# Patient Record
Sex: MALE | Race: OTHER | URBAN - METROPOLITAN AREA
[De-identification: names, ages, dates, MRNs, and addresses within clinical notes are randomized per-mention and may not be internally consistent; named-entity substitution may affect disease eponyms.]

---

## 2018-08-01 ENCOUNTER — OUTPATIENT (OUTPATIENT)
Dept: OUTPATIENT SERVICES | Facility: HOSPITAL | Age: 48
LOS: 1 days | Discharge: HOME | End: 2018-08-01

## 2018-08-01 DIAGNOSIS — F11.20 OPIOID DEPENDENCE, UNCOMPLICATED: ICD-10-CM

## 2018-08-30 ENCOUNTER — OUTPATIENT (OUTPATIENT)
Dept: OUTPATIENT SERVICES | Facility: HOSPITAL | Age: 48
LOS: 1 days | Discharge: HOME | End: 2018-08-30

## 2018-08-30 DIAGNOSIS — F11.20 OPIOID DEPENDENCE, UNCOMPLICATED: ICD-10-CM

## 2018-10-17 ENCOUNTER — EMERGENCY (EMERGENCY)
Facility: HOSPITAL | Age: 48
LOS: 0 days | Discharge: HOME | End: 2018-10-18
Admitting: PHYSICIAN ASSISTANT

## 2018-10-17 VITALS
OXYGEN SATURATION: 98 % | TEMPERATURE: 97 F | RESPIRATION RATE: 18 BRPM | SYSTOLIC BLOOD PRESSURE: 150 MMHG | HEART RATE: 72 BPM | DIASTOLIC BLOOD PRESSURE: 83 MMHG

## 2018-10-17 VITALS
OXYGEN SATURATION: 98 % | HEART RATE: 78 BPM | TEMPERATURE: 98 F | DIASTOLIC BLOOD PRESSURE: 7 MMHG | SYSTOLIC BLOOD PRESSURE: 152 MMHG | RESPIRATION RATE: 20 BRPM
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
## 2018-10-17 DIAGNOSIS — T15.01XA FOREIGN BODY IN CORNEA, RIGHT EYE, INITIAL ENCOUNTER: ICD-10-CM
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
## 2018-10-17 DIAGNOSIS — Z23 ENCOUNTER FOR IMMUNIZATION: ICD-10-CM

## 2018-10-17 DIAGNOSIS — T15.02XA FOREIGN BODY IN CORNEA, LEFT EYE, INITIAL ENCOUNTER: ICD-10-CM
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
## 2018-10-18 RX ORDER — TETANUS TOXOID, REDUCED DIPHTHERIA TOXOID AND ACELLULAR PERTUSSIS VACCINE, ADSORBED 5; 2.5; 8; 8; 2.5 [IU]/.5ML; [IU]/.5ML; UG/.5ML; UG/.5ML; UG/.5ML
0.5 SUSPENSION INTRAMUSCULAR ONCE
Qty: 0 | Refills: 0 | Status: COMPLETED | OUTPATIENT
Start: 2018-10-18 | End: 2018-10-18

## 2018-10-18 RX ORDER — POLYMYXIN B SULF/TRIMETHOPRIM 10000-1/ML
1 DROPS OPHTHALMIC (EYE) ONCE
Qty: 0 | Refills: 0 | Status: COMPLETED | OUTPATIENT
Start: 2018-10-18 | End: 2018-10-18

## 2018-10-18 RX ADMIN — Medication 1 DROP(S): at 00:30

## 2018-10-18 RX ADMIN — TETANUS TOXOID, REDUCED DIPHTHERIA TOXOID AND ACELLULAR PERTUSSIS VACCINE, ADSORBED 0.5 MILLILITER(S): 5; 2.5; 8; 8; 2.5 SUSPENSION INTRAMUSCULAR at 00:30

## 2018-10-18 NOTE — ED PROVIDER NOTE - PHYSICAL EXAMINATION
CONST: Well appearing in NAD  EYES: + small corneal abrasions noted to bilateral eyes, no ulcers or reggie sign on flourstein stain, PERRL, EOMI, Sclera and conjunctiva clear. Vision 20/20 bilaterally   ENT: No nasal discharge. TM's clear B/L without drainage. Oropharynx normal appearing, no erythema or exudates. Uvula midline.  CARD: Normal S1 S2; Normal rate and rhythm  RESP: Equal BS B/L, No wheezes, rhonchi or rales. No distress  MS: Normal ROM in all extremities. No midline spinal tenderness.  SKIN: Warm, dry, no acute rashes. Good turgor

## 2018-10-18 NOTE — ED PROVIDER NOTE - NS ED ROS FT
Review of Systems:  	•	CONSTITUTIONAL - no fever, no diaphoresis, no chills  	•	SKIN - no rash  	•	EYES - + bilateral irritation   	•	ENT - no change in hearing, no sore throat, no ear pain or tinnitus  	•	RESPIRATORY - no shortness of breath, no cough  	•	CARDIAC - no chest pain, no palpitations  	•	GI - no abd pain, no nausea, no vomiting, no diarrhea, no constipation  	•	MUSCULOSKELETAL - no joint paint, no swelling, no redness

## 2018-10-18 NOTE — ED PROVIDER NOTE - OBJECTIVE STATEMENT
48 year old male with no pmhx presents with foreign body to bilateral eyes. Pt admits was using  at work earlier today at4pm, when debris went into eyes. Pt admits was feeling fine however now developing mild irritation. Pt denies visual changes, discharge, or pain.

## 2020-04-20 ENCOUNTER — OUTPATIENT (OUTPATIENT)
Dept: OUTPATIENT SERVICES | Facility: HOSPITAL | Age: 50
LOS: 1 days | Discharge: HOME | End: 2020-04-20
Payer: MEDICAID

## 2020-04-20 DIAGNOSIS — F11.20 OPIOID DEPENDENCE, UNCOMPLICATED: ICD-10-CM

## 2020-04-20 PROCEDURE — 99215 OFFICE O/P EST HI 40 MIN: CPT

## 2020-05-15 ENCOUNTER — OUTPATIENT (OUTPATIENT)
Dept: OUTPATIENT SERVICES | Facility: HOSPITAL | Age: 50
LOS: 1 days | Discharge: HOME | End: 2020-05-15
Payer: MEDICAID

## 2020-05-15 DIAGNOSIS — F11.20 OPIOID DEPENDENCE, UNCOMPLICATED: ICD-10-CM

## 2020-05-15 PROCEDURE — 99212 OFFICE O/P EST SF 10 MIN: CPT

## 2020-06-26 ENCOUNTER — OUTPATIENT (OUTPATIENT)
Dept: OUTPATIENT SERVICES | Facility: HOSPITAL | Age: 50
LOS: 1 days | Discharge: HOME | End: 2020-06-26

## 2020-06-26 DIAGNOSIS — F11.20 OPIOID DEPENDENCE, UNCOMPLICATED: ICD-10-CM

## 2020-08-14 ENCOUNTER — OUTPATIENT (OUTPATIENT)
Dept: OUTPATIENT SERVICES | Facility: HOSPITAL | Age: 50
LOS: 1 days | Discharge: HOME | End: 2020-08-14

## 2020-08-14 DIAGNOSIS — F11.20 OPIOID DEPENDENCE, UNCOMPLICATED: ICD-10-CM

## 2022-01-02 ENCOUNTER — EMERGENCY (EMERGENCY)
Facility: HOSPITAL | Age: 52
LOS: 0 days | Discharge: HOME | End: 2022-01-02
Attending: STUDENT IN AN ORGANIZED HEALTH CARE EDUCATION/TRAINING PROGRAM | Admitting: STUDENT IN AN ORGANIZED HEALTH CARE EDUCATION/TRAINING PROGRAM
Payer: MEDICAID

## 2022-01-02 VITALS
DIASTOLIC BLOOD PRESSURE: 77 MMHG | RESPIRATION RATE: 18 BRPM | OXYGEN SATURATION: 100 % | SYSTOLIC BLOOD PRESSURE: 162 MMHG | TEMPERATURE: 98 F | WEIGHT: 225.09 LBS | HEART RATE: 76 BPM

## 2022-01-02 VITALS
OXYGEN SATURATION: 100 % | SYSTOLIC BLOOD PRESSURE: 138 MMHG | DIASTOLIC BLOOD PRESSURE: 70 MMHG | RESPIRATION RATE: 18 BRPM | HEART RATE: 70 BPM | TEMPERATURE: 98 F

## 2022-01-02 DIAGNOSIS — K08.89 OTHER SPECIFIED DISORDERS OF TEETH AND SUPPORTING STRUCTURES: ICD-10-CM

## 2022-01-02 DIAGNOSIS — K02.9 DENTAL CARIES, UNSPECIFIED: ICD-10-CM

## 2022-01-02 DIAGNOSIS — K03.2 EROSION OF TEETH: ICD-10-CM

## 2022-01-02 PROCEDURE — 99283 EMERGENCY DEPT VISIT LOW MDM: CPT

## 2022-01-02 RX ORDER — IBUPROFEN 200 MG
600 TABLET ORAL ONCE
Refills: 0 | Status: COMPLETED | OUTPATIENT
Start: 2022-01-02 | End: 2022-01-02

## 2022-01-02 RX ORDER — IBUPROFEN 200 MG
1 TABLET ORAL
Qty: 12 | Refills: 0
Start: 2022-01-02 | End: 2022-01-04

## 2022-01-02 RX ORDER — AMOXICILLIN 250 MG/5ML
1 SUSPENSION, RECONSTITUTED, ORAL (ML) ORAL
Qty: 10 | Refills: 0
Start: 2022-01-02 | End: 2022-01-06

## 2022-01-02 RX ADMIN — Medication 600 MILLIGRAM(S): at 19:26

## 2022-01-02 NOTE — ED PROVIDER NOTE - OBJECTIVE STATEMENT
51M no pmhx presents with toothache x several months that worsened today, notes it is right upper, denies bleeding/purulent drainage/shortness of breath/difficulty swallowing, has not seen dentist in years.

## 2022-01-02 NOTE — ED PROVIDER NOTE - PHYSICAL EXAMINATION
Vital Signs: I have reviewed the initial vital signs.  Constitutional: well-nourished, appears stated age, no acute distress.  HEENT: Airway patent, moist MM, no erythema/swelling/deformity of oral structures, dental caries and erosion seen on tooth 3. EOMI, PERRLA.  CV: regular rate, regular rhythm, well-perfused extremities, 2+ b/l DP and radial pulses equal.  Lungs: BCTA, no increased WOB.  ABD: NTND, no guarding or rebound, no pulsatile mass, no hernias, no flank pain.   MSK: Neck supple, nontender, nl ROM, no stepoff. Chest nontender. Back nontender in TLS spine or to b/l bony structures. Ext nontender, nl rom, no deformity.   INTEG: Skin warm, dry, no rash.  NEURO: A&Ox3, moving all extremities, normal speech  PSYCH: Calm, cooperative, normal affect and interaction.

## 2022-01-02 NOTE — ED ADULT NURSE NOTE - NSSEPSISNEWALTERMENTAL_ED_A_ED
No

## 2022-01-02 NOTE — ED PROVIDER NOTE - PATIENT PORTAL LINK FT
You can access the FollowMyHealth Patient Portal offered by [facility] by registering at the following website: [email or web address]. By joining [employer]’s FollowMyHealth portal, you will also be able to view your health information using other applications (apps) compatible with our system.

## 2022-01-02 NOTE — ED PROVIDER NOTE - CLINICAL SUMMARY MEDICAL DECISION MAKING FREE TEXT BOX
51M p/w toothache for several months to R upper tooth; no fever, voice change, inability to tolerate po. pt well appearing, mmm, + ttp with dental carries to tooth #3, uvula midline, no PTA, no tongue elevation or ttp floor of mouth, no lymphadenopathy. pt given motrin, and f/u with dental for definitive treatment.

## 2022-01-02 NOTE — ED PROVIDER NOTE - NSFOLLOWUPCLINICS_GEN_ALL_ED_FT
[facility]  Dental  [address] [place] [id number]  Phone: [phone]  Fax:   Follow Up Time: 1-3 Days

## 2023-12-05 ENCOUNTER — EMERGENCY (EMERGENCY)
Facility: HOSPITAL | Age: 53
LOS: 0 days | Discharge: HOME | End: 2023-12-06
Attending: EMERGENCY MEDICINE
Payer: MEDICAID

## 2023-12-05 VITALS
SYSTOLIC BLOOD PRESSURE: 144 MMHG | OXYGEN SATURATION: 99 % | RESPIRATION RATE: 19 BRPM | HEART RATE: 89 BPM | DIASTOLIC BLOOD PRESSURE: 91 MMHG

## 2023-12-05 DIAGNOSIS — R05.8 OTHER SPECIFIED COUGH: ICD-10-CM

## 2023-12-05 DIAGNOSIS — R07.89 OTHER CHEST PAIN: ICD-10-CM

## 2023-12-05 DIAGNOSIS — R06.02 SHORTNESS OF BREATH: ICD-10-CM

## 2023-12-05 DIAGNOSIS — F17.200 NICOTINE DEPENDENCE, UNSPECIFIED, UNCOMPLICATED: ICD-10-CM

## 2023-12-05 LAB
ALBUMIN SERPL ELPH-MCNC: 4.3 G/DL — SIGNIFICANT CHANGE UP (ref 3.5–5.2)
ALBUMIN SERPL ELPH-MCNC: 4.3 G/DL — SIGNIFICANT CHANGE UP (ref 3.5–5.2)
ALP SERPL-CCNC: 76 U/L — SIGNIFICANT CHANGE UP (ref 30–115)
ALP SERPL-CCNC: 76 U/L — SIGNIFICANT CHANGE UP (ref 30–115)
ALT FLD-CCNC: 24 U/L — SIGNIFICANT CHANGE UP (ref 0–41)
ALT FLD-CCNC: 24 U/L — SIGNIFICANT CHANGE UP (ref 0–41)
ANION GAP SERPL CALC-SCNC: 12 MMOL/L — SIGNIFICANT CHANGE UP (ref 7–14)
ANION GAP SERPL CALC-SCNC: 12 MMOL/L — SIGNIFICANT CHANGE UP (ref 7–14)
AST SERPL-CCNC: 23 U/L — SIGNIFICANT CHANGE UP (ref 0–41)
AST SERPL-CCNC: 23 U/L — SIGNIFICANT CHANGE UP (ref 0–41)
BASOPHILS # BLD AUTO: 0.05 K/UL — SIGNIFICANT CHANGE UP (ref 0–0.2)
BASOPHILS # BLD AUTO: 0.05 K/UL — SIGNIFICANT CHANGE UP (ref 0–0.2)
BASOPHILS NFR BLD AUTO: 0.7 % — SIGNIFICANT CHANGE UP (ref 0–1)
BASOPHILS NFR BLD AUTO: 0.7 % — SIGNIFICANT CHANGE UP (ref 0–1)
BILIRUB SERPL-MCNC: <0.2 MG/DL — SIGNIFICANT CHANGE UP (ref 0.2–1.2)
BILIRUB SERPL-MCNC: <0.2 MG/DL — SIGNIFICANT CHANGE UP (ref 0.2–1.2)
BUN SERPL-MCNC: 22 MG/DL — HIGH (ref 10–20)
BUN SERPL-MCNC: 22 MG/DL — HIGH (ref 10–20)
CALCIUM SERPL-MCNC: 9.2 MG/DL — SIGNIFICANT CHANGE UP (ref 8.4–10.5)
CALCIUM SERPL-MCNC: 9.2 MG/DL — SIGNIFICANT CHANGE UP (ref 8.4–10.5)
CHLORIDE SERPL-SCNC: 106 MMOL/L — SIGNIFICANT CHANGE UP (ref 98–110)
CHLORIDE SERPL-SCNC: 106 MMOL/L — SIGNIFICANT CHANGE UP (ref 98–110)
CO2 SERPL-SCNC: 24 MMOL/L — SIGNIFICANT CHANGE UP (ref 17–32)
CO2 SERPL-SCNC: 24 MMOL/L — SIGNIFICANT CHANGE UP (ref 17–32)
CREAT SERPL-MCNC: 1.2 MG/DL — SIGNIFICANT CHANGE UP (ref 0.7–1.5)
CREAT SERPL-MCNC: 1.2 MG/DL — SIGNIFICANT CHANGE UP (ref 0.7–1.5)
EGFR: 71 ML/MIN/1.73M2 — SIGNIFICANT CHANGE UP
EGFR: 71 ML/MIN/1.73M2 — SIGNIFICANT CHANGE UP
EOSINOPHIL # BLD AUTO: 0.31 K/UL — SIGNIFICANT CHANGE UP (ref 0–0.7)
EOSINOPHIL # BLD AUTO: 0.31 K/UL — SIGNIFICANT CHANGE UP (ref 0–0.7)
EOSINOPHIL NFR BLD AUTO: 4.2 % — SIGNIFICANT CHANGE UP (ref 0–8)
EOSINOPHIL NFR BLD AUTO: 4.2 % — SIGNIFICANT CHANGE UP (ref 0–8)
GLUCOSE SERPL-MCNC: 102 MG/DL — HIGH (ref 70–99)
GLUCOSE SERPL-MCNC: 102 MG/DL — HIGH (ref 70–99)
HCT VFR BLD CALC: 43.8 % — SIGNIFICANT CHANGE UP (ref 42–52)
HCT VFR BLD CALC: 43.8 % — SIGNIFICANT CHANGE UP (ref 42–52)
HGB BLD-MCNC: 14.1 G/DL — SIGNIFICANT CHANGE UP (ref 14–18)
HGB BLD-MCNC: 14.1 G/DL — SIGNIFICANT CHANGE UP (ref 14–18)
IMM GRANULOCYTES NFR BLD AUTO: 0.4 % — HIGH (ref 0.1–0.3)
IMM GRANULOCYTES NFR BLD AUTO: 0.4 % — HIGH (ref 0.1–0.3)
LYMPHOCYTES # BLD AUTO: 2.27 K/UL — SIGNIFICANT CHANGE UP (ref 1.2–3.4)
LYMPHOCYTES # BLD AUTO: 2.27 K/UL — SIGNIFICANT CHANGE UP (ref 1.2–3.4)
LYMPHOCYTES # BLD AUTO: 30.8 % — SIGNIFICANT CHANGE UP (ref 20.5–51.1)
LYMPHOCYTES # BLD AUTO: 30.8 % — SIGNIFICANT CHANGE UP (ref 20.5–51.1)
MAGNESIUM SERPL-MCNC: 2.1 MG/DL — SIGNIFICANT CHANGE UP (ref 1.8–2.4)
MAGNESIUM SERPL-MCNC: 2.1 MG/DL — SIGNIFICANT CHANGE UP (ref 1.8–2.4)
MCHC RBC-ENTMCNC: 29.7 PG — SIGNIFICANT CHANGE UP (ref 27–31)
MCHC RBC-ENTMCNC: 29.7 PG — SIGNIFICANT CHANGE UP (ref 27–31)
MCHC RBC-ENTMCNC: 32.2 G/DL — SIGNIFICANT CHANGE UP (ref 32–37)
MCHC RBC-ENTMCNC: 32.2 G/DL — SIGNIFICANT CHANGE UP (ref 32–37)
MCV RBC AUTO: 92.4 FL — SIGNIFICANT CHANGE UP (ref 80–94)
MCV RBC AUTO: 92.4 FL — SIGNIFICANT CHANGE UP (ref 80–94)
MONOCYTES # BLD AUTO: 0.72 K/UL — HIGH (ref 0.1–0.6)
MONOCYTES # BLD AUTO: 0.72 K/UL — HIGH (ref 0.1–0.6)
MONOCYTES NFR BLD AUTO: 9.8 % — HIGH (ref 1.7–9.3)
MONOCYTES NFR BLD AUTO: 9.8 % — HIGH (ref 1.7–9.3)
NEUTROPHILS # BLD AUTO: 4 K/UL — SIGNIFICANT CHANGE UP (ref 1.4–6.5)
NEUTROPHILS # BLD AUTO: 4 K/UL — SIGNIFICANT CHANGE UP (ref 1.4–6.5)
NEUTROPHILS NFR BLD AUTO: 54.1 % — SIGNIFICANT CHANGE UP (ref 42.2–75.2)
NEUTROPHILS NFR BLD AUTO: 54.1 % — SIGNIFICANT CHANGE UP (ref 42.2–75.2)
NRBC # BLD: 0 /100 WBCS — SIGNIFICANT CHANGE UP (ref 0–0)
NRBC # BLD: 0 /100 WBCS — SIGNIFICANT CHANGE UP (ref 0–0)
PLATELET # BLD AUTO: 248 K/UL — SIGNIFICANT CHANGE UP (ref 130–400)
PLATELET # BLD AUTO: 248 K/UL — SIGNIFICANT CHANGE UP (ref 130–400)
PMV BLD: 11.2 FL — HIGH (ref 7.4–10.4)
PMV BLD: 11.2 FL — HIGH (ref 7.4–10.4)
POTASSIUM SERPL-MCNC: 4.4 MMOL/L — SIGNIFICANT CHANGE UP (ref 3.5–5)
POTASSIUM SERPL-MCNC: 4.4 MMOL/L — SIGNIFICANT CHANGE UP (ref 3.5–5)
POTASSIUM SERPL-SCNC: 4.4 MMOL/L — SIGNIFICANT CHANGE UP (ref 3.5–5)
POTASSIUM SERPL-SCNC: 4.4 MMOL/L — SIGNIFICANT CHANGE UP (ref 3.5–5)
PROT SERPL-MCNC: 7.5 G/DL — SIGNIFICANT CHANGE UP (ref 6–8)
PROT SERPL-MCNC: 7.5 G/DL — SIGNIFICANT CHANGE UP (ref 6–8)
RBC # BLD: 4.74 M/UL — SIGNIFICANT CHANGE UP (ref 4.7–6.1)
RBC # BLD: 4.74 M/UL — SIGNIFICANT CHANGE UP (ref 4.7–6.1)
RBC # FLD: 14.2 % — SIGNIFICANT CHANGE UP (ref 11.5–14.5)
RBC # FLD: 14.2 % — SIGNIFICANT CHANGE UP (ref 11.5–14.5)
SODIUM SERPL-SCNC: 142 MMOL/L — SIGNIFICANT CHANGE UP (ref 135–146)
SODIUM SERPL-SCNC: 142 MMOL/L — SIGNIFICANT CHANGE UP (ref 135–146)
TROPONIN T SERPL-MCNC: <0.01 NG/ML — SIGNIFICANT CHANGE UP
TROPONIN T SERPL-MCNC: <0.01 NG/ML — SIGNIFICANT CHANGE UP
WBC # BLD: 7.38 K/UL — SIGNIFICANT CHANGE UP (ref 4.8–10.8)
WBC # BLD: 7.38 K/UL — SIGNIFICANT CHANGE UP (ref 4.8–10.8)
WBC # FLD AUTO: 7.38 K/UL — SIGNIFICANT CHANGE UP (ref 4.8–10.8)
WBC # FLD AUTO: 7.38 K/UL — SIGNIFICANT CHANGE UP (ref 4.8–10.8)

## 2023-12-05 PROCEDURE — 71045 X-RAY EXAM CHEST 1 VIEW: CPT

## 2023-12-05 PROCEDURE — 85025 COMPLETE CBC W/AUTO DIFF WBC: CPT

## 2023-12-05 PROCEDURE — 96374 THER/PROPH/DIAG INJ IV PUSH: CPT

## 2023-12-05 PROCEDURE — 71045 X-RAY EXAM CHEST 1 VIEW: CPT | Mod: 26

## 2023-12-05 PROCEDURE — 84484 ASSAY OF TROPONIN QUANT: CPT

## 2023-12-05 PROCEDURE — 99285 EMERGENCY DEPT VISIT HI MDM: CPT | Mod: 25

## 2023-12-05 PROCEDURE — 80053 COMPREHEN METABOLIC PANEL: CPT

## 2023-12-05 PROCEDURE — 93010 ELECTROCARDIOGRAM REPORT: CPT

## 2023-12-05 PROCEDURE — 99285 EMERGENCY DEPT VISIT HI MDM: CPT

## 2023-12-05 PROCEDURE — 83735 ASSAY OF MAGNESIUM: CPT

## 2023-12-05 PROCEDURE — 36415 COLL VENOUS BLD VENIPUNCTURE: CPT

## 2023-12-05 PROCEDURE — 93005 ELECTROCARDIOGRAM TRACING: CPT

## 2023-12-05 PROCEDURE — 94640 AIRWAY INHALATION TREATMENT: CPT

## 2023-12-05 RX ORDER — ALBUTEROL 90 UG/1
2 AEROSOL, METERED ORAL EVERY 6 HOURS
Refills: 0 | Status: DISCONTINUED | OUTPATIENT
Start: 2023-12-05 | End: 2023-12-06

## 2023-12-05 RX ORDER — IPRATROPIUM/ALBUTEROL SULFATE 18-103MCG
9 AEROSOL WITH ADAPTER (GRAM) INHALATION ONCE
Refills: 0 | Status: COMPLETED | OUTPATIENT
Start: 2023-12-05 | End: 2023-12-05

## 2023-12-05 RX ORDER — DEXAMETHASONE 0.5 MG/5ML
10 ELIXIR ORAL ONCE
Refills: 0 | Status: COMPLETED | OUTPATIENT
Start: 2023-12-05 | End: 2023-12-05

## 2023-12-05 RX ORDER — OMEPRAZOLE 10 MG/1
1 CAPSULE, DELAYED RELEASE ORAL
Qty: 14 | Refills: 0
Start: 2023-12-05 | End: 2023-12-18

## 2023-12-05 RX ORDER — FAMOTIDINE 10 MG/ML
1 INJECTION INTRAVENOUS
Qty: 42 | Refills: 0
Start: 2023-12-05 | End: 2023-12-18

## 2023-12-05 RX ORDER — FAMOTIDINE 10 MG/ML
20 INJECTION INTRAVENOUS ONCE
Refills: 0 | Status: COMPLETED | OUTPATIENT
Start: 2023-12-05 | End: 2023-12-05

## 2023-12-05 RX ADMIN — FAMOTIDINE 20 MILLIGRAM(S): 10 INJECTION INTRAVENOUS at 22:47

## 2023-12-05 RX ADMIN — ALBUTEROL 2 PUFF(S): 90 AEROSOL, METERED ORAL at 23:58

## 2023-12-05 RX ADMIN — Medication 9 MILLILITER(S): at 21:16

## 2023-12-05 RX ADMIN — Medication 10 MILLIGRAM(S): at 21:15

## 2023-12-05 NOTE — ED ADULT TRIAGE NOTE - CHIEF COMPLAINT QUOTE
pt shortness of breath x 1 month was put on antibiotics 4-5 days ago but states he feels like its getting owrse also co chest pain x 2 weeks

## 2023-12-05 NOTE — ED PROVIDER NOTE - PROGRESS NOTE DETAILS
JS: Patient feeling better after nebs and steroid. Labs WNL, CXR and EKG wnl. Will dicahrge w/ PPI, albuterol inhaler and follow up with ENT, cards and pulm. Patient's partner will scheudle follow up apt with PCP Dr. Haney. Strict return precautions discussed.

## 2023-12-05 NOTE — ED ADULT TRIAGE NOTE - CCCP TRG CHIEF CMPLNT
Dear Karolyn,    Red AdvertisingMarshfield Medical Center - Ladysmith Rusk County.  It was an honor and a privilege to participate in your care.       Healthy regards,    Dominique Escalona, PRAKASH  [facility] shortness of breath

## 2023-12-05 NOTE — ED PROVIDER NOTE - NS ED ATTENDING STATEMENT MOD
This was a shared visit with the SPENCER. I reviewed and verified the documentation and independently performed the documented:

## 2023-12-05 NOTE — ED PROVIDER NOTE - CLINICAL SUMMARY MEDICAL DECISION MAKING FREE TEXT BOX
53-year-old man, smoker, but no other medical history complains of dry cough intermittent over the past month, occasionally associated with shortness of breath, mostly after a "coughing fit" and burning chest discomfort worse at night and with eating.  Went to an urgent care about a week ago and got antibiotics, but no improvement.  On exam patient is very well-appearing, dry, upper airway cough noted.  Mild pharyngeal erythema without exudate.  Lungs clear, CV S1-S2, abdomen soft, nontender.  Doubt cardiac etiology.  Cough sounds bronchospastic in nature, patient is a smoker, drinks a lot of coffee.  Gave nebs and steroids with some improvement.  EKG and chest x-ray unremarkable.  Symptoms likely hyperactive airway versus postnasal drip, possible GERD component.  Patient discharged with PPI, albuterol inhaler, ENT/pulmonology follow-up.  Strict return precautions given patient is comfortable with discharge.

## 2023-12-05 NOTE — ED PROVIDER NOTE - PATIENT PORTAL LINK FT
You can access the FollowMyHealth Patient Portal offered by [facility] by registering at the following website: [email or web address]. By joining [employer]’s FollowMyHealth portal, you will also be able to view your health information using other applications (apps) compatible with our system. You can access the FollowMyHealth Patient Portal offered by [facility] by registering at the following website: [email or web address]. By joining [employer]’s FollowMyHealth portal, you will also be able to view your health information using other applications (apps) compatible with our system.

## 2023-12-05 NOTE — ED PROVIDER NOTE - OBJECTIVE STATEMENT
The 53-year-old male daily smoker with no reported past medical history presents to the ED complaining of cough x 1 month. Patient w/ intermittent cough w/ associated productive sputum. Assocaited SOB and chest burning intermittently. Went to UC 5 days ago recieved abx w. minimal improvement. No feves, chills, N/v/d abd pain, fhx of cardiac dz.

## 2023-12-05 NOTE — ED PROVIDER NOTE - NSFOLLOWUPINSTRUCTIONS_ED_ALL_ED_FT
Shortness of breath    Shortness of breath means you have trouble breathing and could indicate a medical problem. Causes include lung diseases, heart disease, low amount of red blood cells (anemia), poor physical fitness, being overweight, smoking, etc. Your health care provider may not be able to find a cause for your shortness of breath after your exam. In this case, it is important to have a follow-up exam with your primary care physician as instructed. If medicines were prescribed, take them as directed for the full length of time directed. Refrain from tobacco products.    SEEK IMMEDIATE MEDICAL CARE IF YOU HAVE THE FOLLOWING SYMPTOMS: worsening shortness of breath, chest pain, back pain, abdominal pain, fever, coughing up blood, lightheadedness/dizziness.    Postnasal Drip    WHAT YOU NEED TO KNOW:    What is postnasal drip? Postnasal drip is a condition that causes a large amount of mucus to collect in your throat or nose. It may also be called upper airway cough syndrome because the mucus causes repeated coughing. You may have a sore throat, or throat tissues may swell. This may feel like a lump in your throat. You may also feel like you need to clear your throat often.    What causes postnasal drip?    A cold or the flu    Allergies, such as hay fever or a milk allergy    Cold air, or dry air in a heated area    Pregnancy or hormone changes    Medical conditions such as a deviated septum, gastroesophageal reflux (GERD), or problems with structures in your throat    Certain medicines, such as birth control pills and blood pressure medicines    An infection in your sinuses or nose  How is postnasal drip diagnosed and treated? Your healthcare provider will examine you and ask about your symptoms. Tell your provider if you have symptoms all the time or if they come and go. Include anything that triggers your symptoms, such as cold air or pollen. A sample of the mucus may be tested for bacteria that could be causing your symptoms.    Medicines may be given to thin the mucus. You may need to swallow the medicine or use a device to flush your sinuses with liquid squirted into your nose. Nasal sprays may also be needed to keep the tissues in your nose moist. Medicines can also relieve congestion. Allergy medicine may help if your symptoms are caused by seasonal allergies, such as hay fever. You may need medicine to help control GERD.    Antibiotics may be needed to treat a bacterial infection.  What can I do to manage postnasal drip?    Use a humidifier or vaporizer. Use a cool mist humidifier or a vaporizer to increase air moisture in your home. This may make it easier for you to breathe.    Drink more liquids as directed. Liquids help keep your air passages moist and help you cough up mucus. Ask how much liquid to drink each day and which liquids are best for you.    Avoid cold air and dry, heated air. Cold or dry air can trigger postnasal drip. Try to stay inside on cold days, or keep your mouth covered. Do not stay long in areas that have dry, heated air.    Do not smoke, and avoid secondhand smoke. Nicotine and other chemicals in cigarettes and cigars can irritate your throat and make coughing worse. Ask your healthcare provider for information if you currently smoke and need help to quit. E-cigarettes or smokeless tobacco still contain nicotine. Talk to your healthcare provider before you use these products.  When should I contact my healthcare provider?    You have trouble breathing because of the mucus.    You have new or worsening symptoms, even with treatment.    You have signs of an infection, such as yellow or green mucus, or a fever.    You have questions or concerns about your condition or care.  CARE AGREEMENT:    You have the right to help plan your care. Learn about your health condition and how it may be treated. Discuss treatment options with your healthcare providers to decide what care you want to receive. You always have the right to refuse treatment.

## 2023-12-05 NOTE — ED PROVIDER NOTE - NSPTACCESSSVCSAPPTDETAILS_ED_ALL_ED_FT
Post nasal drip - ENT  Please also schedule for primary care - does not have a PCP Post nasal drip - ENT  Please also schedule for pulmonologist and cardiologist

## 2023-12-06 PROBLEM — Z78.9 OTHER SPECIFIED HEALTH STATUS: Chronic | Status: ACTIVE | Noted: 2022-01-02

## 2023-12-06 NOTE — ED ADULT NURSE NOTE - NSFALLUNIVINTERV_ED_ALL_ED
Bed/Stretcher in lowest position, wheels locked, appropriate side rails in place/Call bell, personal items and telephone in reach/Instruct patient to call for assistance before getting out of bed/chair/stretcher/Non-slip footwear applied when patient is off stretcher/[place] to call system/Physically safe environment - no spills, clutter or unnecessary equipment/Purposeful proactive rounding/Room/bathroom lighting operational, light cord in reach Bed/Stretcher in lowest position, wheels locked, appropriate side rails in place/Call bell, personal items and telephone in reach/Instruct patient to call for assistance before getting out of bed/chair/stretcher/Non-slip footwear applied when patient is off stretcher/[place] to call system/Physically safe environment - no spills, clutter or unnecessary equipment/Purposeful proactive rounding/Room/bathroom lighting operational, light cord in reach

## 2024-01-31 ENCOUNTER — NON-APPOINTMENT (OUTPATIENT)
Age: 54
End: 2024-01-31

## 2024-02-02 ENCOUNTER — EMERGENCY (EMERGENCY)
Facility: HOSPITAL | Age: 54
LOS: 0 days | Discharge: HOME | End: 2024-02-02
Attending: EMERGENCY MEDICINE
Payer: MEDICAID

## 2024-02-02 VITALS
DIASTOLIC BLOOD PRESSURE: 69 MMHG | HEART RATE: 91 BPM | WEIGHT: 259.93 LBS | OXYGEN SATURATION: 95 % | RESPIRATION RATE: 18 BRPM | SYSTOLIC BLOOD PRESSURE: 141 MMHG | HEIGHT: 68 IN | TEMPERATURE: 98 F

## 2024-02-02 DIAGNOSIS — Z23 ENCOUNTER FOR IMMUNIZATION: ICD-10-CM

## 2024-02-02 DIAGNOSIS — T15.92XA FOREIGN BODY ON EXTERNAL EYE, PART UNSPECIFIED, LEFT EYE, INITIAL ENCOUNTER: ICD-10-CM
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
## 2024-02-02 PROCEDURE — 90471 IMMUNIZATION ADMIN: CPT

## 2024-02-02 PROCEDURE — 99284 EMERGENCY DEPT VISIT MOD MDM: CPT | Mod: 25

## 2024-02-02 PROCEDURE — 99283 EMERGENCY DEPT VISIT LOW MDM: CPT

## 2024-02-02 PROCEDURE — 90715 TDAP VACCINE 7 YRS/> IM: CPT

## 2024-02-02 PROCEDURE — 99284 EMERGENCY DEPT VISIT MOD MDM: CPT

## 2024-02-02 RX ORDER — TETANUS TOXOID, REDUCED DIPHTHERIA TOXOID AND ACELLULAR PERTUSSIS VACCINE, ADSORBED 5; 2.5; 8; 8; 2.5 [IU]/.5ML; [IU]/.5ML; UG/.5ML; UG/.5ML; UG/.5ML
0.5 SUSPENSION INTRAMUSCULAR ONCE
Refills: 0 | Status: COMPLETED | OUTPATIENT
Start: 2024-02-02 | End: 2024-02-02

## 2024-02-02 RX ORDER — OFLOXACIN 0.3 %
1 DROPS OPHTHALMIC (EYE)
Qty: 1 | Refills: 0
Start: 2024-02-02

## 2024-02-02 RX ORDER — FLUORESCEIN SODIUM 9 MG
1 STRIP OPHTHALMIC (EYE) ONCE
Refills: 0 | Status: COMPLETED | OUTPATIENT
Start: 2024-02-02 | End: 2024-02-02

## 2024-02-02 RX ADMIN — TETANUS TOXOID, REDUCED DIPHTHERIA TOXOID AND ACELLULAR PERTUSSIS VACCINE, ADSORBED 0.5 MILLILITER(S): 5; 2.5; 8; 8; 2.5 SUSPENSION INTRAMUSCULAR at 15:49

## 2024-02-02 RX ADMIN — Medication 1 APPLICATION(S): at 12:54

## 2024-02-02 RX ADMIN — Medication 1 DROP(S): at 12:54

## 2024-02-02 NOTE — ED PROVIDER NOTE - PHYSICAL EXAMINATION
CONSTITUTIONAL: Well-developed; well-nourished; in no acute distress.   SKIN: Warm, dry  HEAD: Normocephalic; atraumatic  EYES: PERRL, EOMI, normal sclera and conjunctiva. OD 20/30; OS 20/25. Left cornea pinpoint sized FB, no fluorescein uptake, no discharge.  ENT: No nasal discharge; airway clear.  EXT: Normal ROM.   NEURO: Alert, oriented, grossly unremarkable  PSYCH: Cooperative, appropriate.

## 2024-02-02 NOTE — ED PROVIDER NOTE - PROGRESS NOTE DETAILS
Authored by Dr. Sanchez: pt left to [facility] Authored by Dr. Sanchez: s/o to dr gamez - f/u ophtho eval and dispo

## 2024-02-02 NOTE — ED PROVIDER NOTE - ATTENDING CONTRIBUTION TO CARE
53-year-old with left eye foreign body.  Seen yesterday in urgent care.  Referred to ophthalmology.  Came to emergency department.    Vital stable nontoxic no photophobia EOMI PERRL conjunctiva is not injected,  positive for body OS, small punctum.  No hyphema.    Ophthalmology eval

## 2024-02-02 NOTE — ED PROVIDER NOTE - NSFOLLOWUPCLINICS_GEN_ALL_ED_FT
[facility]  Ophthalmolgy  [address], [address]  [place] [address]  Phone: [phone]  Fax:   Follow Up Time: 1-3 Days

## 2024-02-02 NOTE — ED ADULT TRIAGE NOTE - PRO INTERPRETER NEED 2
Upon first contact with patient this RN receives bedside shift report from [facility]. Pt resting quietly in room no needs expressed. Side rails up x2 with call light in reach. Will continue to monitor.          Roni Lopez RN  05/27/20 1912
English

## 2024-02-02 NOTE — CONSULT NOTE ADULT - ASSESSMENT
ASSESSMENT  1. Metallic Foreign Body OS   - removed in office without complication  - mild residual rust s/p algar brush. Monitor at follow up    RECOMMENDATIONS  - Start ocuflox ophthalmic solution 4x/day OS  - Edu to seek eyecare ASAP if vision/pain/redness worsens  - Follow up at [facility] eye clinic as out patient Mon 2/5/24     *Please prescribe ophthalmic solution thru ED* ASSESSMENT  1. Metallic Foreign Body OS   - removed in office without complication  - mild residual rust s/p algar brush. Monitor at follow up    RECOMMENDATIONS  - Start ocuflox ophthalmic solution 4x/day OS  - Edu to seek eyecare ASAP if vision/pain/redness worsens over weekend   - Follow up at [facility] eye clinic as out patient Mon 2/5/24 at 1pm (scheduled during in-pt assessment)    *Please prescribe ophthalmic solution thru ED*

## 2024-02-02 NOTE — ED PROVIDER NOTE - NSFOLLOWUPINSTRUCTIONS_ED_ALL_ED_FT
Follow up at eye clinic as instructed on 2/5.    Eye Foreign Body  An eye foreign body is an object on the surface of the eye or in the eyeball that should not be there. The foreign body may be a small speck of dirt or dust, a hair or eyelash, a splinter, a tiny piece of metal, or any other object.    A foreign body can injure the eye. It may be found on the outside of the eyeball (extraocular) or inside the eyeball (intraocular). An intraocular foreign body is a medical emergency because it can result in vision loss or loss of the eye.    What are the causes?  This condition is caused by an object accidentally hitting or entering the eye. A common cause is when a tiny piece of metal is thrown into the air while a person is working with certain tools.    What are the signs or symptoms?  Symptoms of this condition depend on what the foreign body is, and where it is in the eye. In some cases, a small foreign body may cause few symptoms at first. Foreign bodies are commonly found:  On the inner surface of the eyelids or on the covering of the white part of the eye (conjunctiva). A foreign body here may cause:  Pain and irritation, especially when blinking.  Feeling like something is in the eye.  Tearing.  Redness.  On the surface of the clear covering on the front of the eye (cornea). A foreign body here may cause:  Pain and irritation.  Small "rust rings" around a metal (metallic) foreign body.  Feeling like something is in the eye.  Blurry vision.  Tearing.  Redness.  Inside the eyeball. A foreign body here may cause:  A lot of pain.  Immediate loss of vision or blurry vision.  A change in the shape of the black part of the eye (distortion of the pupil).  How is this diagnosed?  Foreign bodies are found during an exam by an eye care specialist.  Foreign bodies on the eyelids, conjunctiva, or cornea are usually (but not always) easily found.  When a foreign body is inside the eyeball, cloudiness in the lens of the eye (cataract) may form almost right away. This makes it hard for an eye specialist to find the foreign body. You may need tests, such as:  Ultrasound.  X-rays.  CT scan.  How is this treated?  Foreign bodies on the eyelids, conjunctiva, or cornea are often removed easily and painlessly. Your health care provider may do this by washing the eye or using small instruments to take the foreign body out. Treatment may also include:  Using numbing (anesthetic) eye drops to relieve pain.  Removal of rust in the cornea using a small, drill-like instrument.  Antibiotic drops or ointment if the foreign body caused a scratch or scrape (abrasion) of the cornea.  Wearing a bandage (eye shield) over your eye.  If you have a foreign body inside your eyeball, you will need surgery right away.    You may need to be referred to an eye specialist (ophthalmologist) for further treatment.    Follow these instructions at home:    Medicines    Take over-the-counter and prescription medicines only as told by your health care provider. Use eye drops or ointment as directed.  If you were prescribed antibiotic drops or ointment, use it as told by your health care provider. Do not stop using the antibiotic even if your condition improves.  General instructions    If you have an eye shield:  Wear it as directed. Follow instructions from your health care provider about when to remove the shield.  Do not drive or use machinery while wearing the shield.  If you do not have an eye shield:  Keep your eye closed as much as possible.  Do not rub your eye.  Do not wear contact lenses until your eye feels normal again, or as instructed by your health care provider.  Wear dark sunglasses as needed to protect your eyes from bright light.  If you are doing activities with a high risk of eye injury, such as working with high-speed tools, wear protective eye covering.  Keep all follow-up visits. This is important.  Contact a health care provider if:  You have more pain in your eye.  You have problems with your eye shield.  You have abnormal fluid (discharge) coming from your eye.  Get help right away if:  Your vision gets worse.  You have more redness and swelling in or around your eye.  Summary  An eye foreign body is an object on the surface of the eye or in the eyeball that should not be there.  A foreign body can injure the eye. It may be found on the outside of the eyeball (extraocular) or inside the eyeball (intraocular). An intraocular foreign body is a medical emergency.  This condition is caused by objects that accidentally contact or enter the eye. Examples of these objects include dirt, dust, glass, metal, or an eyelash.  Treatment may involve removal of the foreign body by washing the eye, using certain instruments, or surgery. You may need to use antibiotics or wear a bandage (eye shield) over your eye.  This information is not intended to replace advice given to you by your health care provider. Make sure you discuss any questions you have with your health care provider.

## 2024-02-02 NOTE — ED PROVIDER NOTE - PATIENT PORTAL LINK FT
You can access the FollowMyHealth Patient Portal offered by [facility] by registering at the following website: [email or web address]. By joining [employer]’s FollowMyHealth portal, you will also be able to view your health information using other applications (apps) compatible with our system.

## 2024-02-02 NOTE — ED ADULT TRIAGE NOTE - CHIEF COMPLAINT QUOTE
Pt. c/o metal to the L eye. Pt. denies vision changes. Pt. states he was seen at  and they removed a piece of metal, but states there is still more.

## 2024-02-02 NOTE — CONSULT NOTE ADULT - SUBJECTIVE AND OBJECTIVE BOX
ALLERGIES: No Known Allergies      HEALTH ISSUES------------------------------------------------------  MEWS Score    No pertinent past medical history    No significant past surgical history    METAL IN EYE    58    SysAdmin_VisitLink          PRESCRIPTIONS-----------------------------------------------------  amoxicillin 500 mg oral tablet: 1 tab(s) orally 2 times a day   famotidine 20 mg oral tablet: 1 tab(s) orally every 8 hours  ibuprofen 600 mg oral tablet: 1 tab(s) orally every 6 hours   omeprazole 40 mg oral delayed release capsule: 1 cap(s) orally once a day          VISIT-------------------------------------------------------------------        T(C): 36.9 (02-02-24 @ 12:18), Max: 36.9 (02-02-24 @ 12:18)  HR: 91 (02-02-24 @ 12:18) (91 - 91)  BP: 141/69 (02-02-24 @ 12:18) (141/69 - 141/69)  RR: 18 (02-02-24 @ 12:18) (18 - 18)  SpO2: 95% (02-02-24 @ 12:18) (95% - 95%)        EYE EXAM-----------------------------------------------------------    Chief Complaint:   - grinding metal during home project 2 days ago (Wed 1/31). Pt reports photophobia and tearing prompting urgent care visit last night (2/1), removed big piece of embedded metal. Pt reports urgent care noted some residual foreign body left behind, unable to remove it.   Denies pain, irritated OS   h/o FB, never needed to be removed. Long history working in construction     MIRIAN: 5-10 years ago  - PMHx: unremarkable, LPE 2 years ago     Entrance Testing  VAsc: OD 20/30-2, PH 20/25           OS 20/30-2. PH NI  Pupils: PERRL OU, (-)APD OD, OS   EOMs: full OD, OS  CF: full OD, OS    IOP taken via [facility] @  OD  OS    Anterior Segment, assessed via hand-held slit lamp / 20D+transilluminator   Adnexa:  Lids:  Conjunctiva:  Cornea:  AC:  Iris:    Dilation deferred. / Dilated with 1gtt tropicamide 1% OU and phenylephrine 2.5% OU    Posterior Segment, assessed via BIO+20D  Lens:   Vitreous:  Optic Nerve:  Macula:  Vessels:  Periphery:          ALLERGIES: No Known Allergies      HEALTH ISSUES------------------------------------------------------  MEWS Score    No pertinent past medical history    No significant past surgical history    METAL IN EYE    58    SysAdmin_VisitLink          PRESCRIPTIONS-----------------------------------------------------  amoxicillin 500 mg oral tablet: 1 tab(s) orally 2 times a day   famotidine 20 mg oral tablet: 1 tab(s) orally every 8 hours  ibuprofen 600 mg oral tablet: 1 tab(s) orally every 6 hours   omeprazole 40 mg oral delayed release capsule: 1 cap(s) orally once a day          VISIT-------------------------------------------------------------------        T(C): 36.9 (02-02-24 @ 12:18), Max: 36.9 (02-02-24 @ 12:18)  HR: 91 (02-02-24 @ 12:18) (91 - 91)  BP: 141/69 (02-02-24 @ 12:18) (141/69 - 141/69)  RR: 18 (02-02-24 @ 12:18) (18 - 18)  SpO2: 95% (02-02-24 @ 12:18) (95% - 95%)        EYE EXAM-----------------------------------------------------------    Chief Complaint:   - grinding metal during home project 2 days ago (Wed 1/31). Pt reports photophobia and tearing prompting urgent care visit last night (2/1), removed big piece of embedded metal. Pt reports urgent care noted some residual foreign body left behind, unable to remove it.   Denies pain, irritated OS   h/o FB, never needed to be removed. Long history working in construction     MIRIAN: 5-10 years ago  - PMHx: unremarkable, LPE 2 years ago     Entrance Testing  VAsc: OD 20/30-2, PH 20/25           OS 20/30-2. PH NI  Pupils: PERRL OU, (-)APD OD, OS   EOMs: full OD, OS  CF: full OD, OS    IOP taken via [facility] @  OD  OS    Anterior Segment, assessed via hand-held slit lamp / 20D+transilluminator   Adnexa/Lids: WNL    Conjunctiva: cl OD, OS   Cornea: cl OD, small metallic FB with associated rust adjacent to temp/slightly inferior pupillary margin OS (@3-4:00)  AC:  Iris:    Dilation deferred. / Dilated with 1gtt tropicamide 1% OU and phenylephrine 2.5% OU    Posterior Segment, assessed via BIO+20D  Lens: cl OU  Vitreous: cl OU   Optic Nerve: 0.40 OD, 0.35 OS - slightly hazy view OS 2/2 cornea. Appears distinct and well perfused OU   Macula: flat OU   Vessels: normal caliber to extent seen OU          ALLERGIES: No Known Allergies      HEALTH ISSUES------------------------------------------------------  MEWS Score    No pertinent past medical history    No significant past surgical history    METAL IN EYE    58    SysAdmin_VisitLink          PRESCRIPTIONS-----------------------------------------------------  amoxicillin 500 mg oral tablet: 1 tab(s) orally 2 times a day   famotidine 20 mg oral tablet: 1 tab(s) orally every 8 hours  ibuprofen 600 mg oral tablet: 1 tab(s) orally every 6 hours   omeprazole 40 mg oral delayed release capsule: 1 cap(s) orally once a day          VISIT-------------------------------------------------------------------        T(C): 36.9 (02-02-24 @ 12:18), Max: 36.9 (02-02-24 @ 12:18)  HR: 91 (02-02-24 @ 12:18) (91 - 91)  BP: 141/69 (02-02-24 @ 12:18) (141/69 - 141/69)  RR: 18 (02-02-24 @ 12:18) (18 - 18)  SpO2: 95% (02-02-24 @ 12:18) (95% - 95%)        EYE EXAM-----------------------------------------------------------  assessed at [facility]    Chief Complaint: 53 year old male present c/o irritation and foreign body OS. Pt reports grinding metal during home project 2 days ago (Wed 1/31) without safety glasses. Pt reports onset of photophobia and tearing following grinding metal prompting urgent care visit last night (2/1). At urgent care, pt reports a big piece of metal was removed from left eye, however urgent care noted some residual foreign body left behind, unable to remove it. Today, pt denies pain, but endorses irritation OS. Denies vision changes.    MIRIAN: 5-10 years ago  - h/o FB to the eye, never needed to be removed. Long history working in construction per pt  - PMHx: unremarkable, LPE 2 years ago     Entrance Testing  VAsc: OD 20/30-2, PH 20/25           OS 20/30-2. PH NI  Pupils: PERRL OU, (-)APD OD, OS   EOMs: full OD, OS  CF: full OD, OS    IOP taken via [facility] @2:53pm  OD 13 mmHg  OS 13 mmHg    Anterior/Posterior Segment, assessed via slit lamp    Adnexa/Lids: WNL    Conjunctiva: cl OD, OS   Cornea: cl OD, small metallic FB with associated rust adjacent to temp/slightly inferior pupillary margin OS (@3-4:00)  --s/p removal: ~1mm x 1mm epi defect with mild residual rust in anterior stroma. ~50% rust removed with algar brush, remaining rust appears deep.   AC: deep & quiet OU   Iris: WNL OU   Lens: cl OU  Vitreous: cl OU   Optic Nerve: 0.40 OD, 0.35 OS - slightly hazy view OS 2/2 cornea. Appears distinct and well perfused OU   Macula: flat OU   Vessels: normal caliber to extent seen OU

## 2024-02-02 NOTE — ED PROVIDER NOTE - CLINICAL SUMMARY MEDICAL DECISION MAKING FREE TEXT BOX
Endorsed from Dr Sanchez  pt w/ L eye FB in ophtho clinic for treatment    Returnred from ophtho after FB removal.  Clear for d/c home w/ outpatient f/u

## 2024-02-02 NOTE — ED PROVIDER NOTE - OBJECTIVE STATEMENT
51-year-old male with PMH 53-year-old male with no PMH presenting for left eye foreign body since yesterday.  Patient states he was grinding metal frame at home when something went into his eye, was seen at [facility] [place] and had a metal foreign body removed from left cornea but was told that there is still more and he should go to ED.  Patient denies vision change, eye tearing, discharge, dizziness, headache.

## 2024-02-05 ENCOUNTER — APPOINTMENT (OUTPATIENT)
Dept: OPHTHALMOLOGY | Facility: CLINIC | Age: 54
End: 2024-02-05

## 2024-02-05 PROBLEM — Z00.00 ENCOUNTER FOR PREVENTIVE HEALTH EXAMINATION: Status: ACTIVE | Noted: 2024-02-05

## 2024-03-27 NOTE — ED PROVIDER NOTE - CHIEF COMPLAINT
I saw and examined the patient at bedside.  He relates a history of some right leg pain.  Most of the pain is in his right calf and mid lower medial thigh.  The calf and the lower extent of the leg demonstrates mild reticulate erythema and swelling which he reports is typical.  He has a band of lymphangitis in the medial aspect of the right thigh.  He has also a palpable Davi circumscribed nontender mass in the right groin.  All of the pain is in the leg and thigh.  I reviewed the CT images with the radiologist which demonstrates some mild stranding in the soft tissues without gas or significant inflammation.  It is difficult to tell if the right groin mass is soft tissue or hematoma but there may be some flow in it noted on the ultrasound of the same day.  I drew lines around the cellulitis on the morning of 3/28/2024.  We will see if this progresses or his pain changes.  Radiologist recommended consideration for repeat imaging of the groin mass at an interval to determine its characteristics.  If it indeed is a solid mass and then image guided core needle biopsy or possibly open biopsy would be typical management.  If he does not improve then I would offer him that in the OR later today.  We will maintain him n.p.o. for now.  He should elevate the right lower extremity.  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
The patient is a 51y Male complaining of toothache.

## 2024-07-19 NOTE — ED ADULT NURSE NOTE - EXTENSIONS OF SELF_ADULT
Problem: At Risk for Falls  Goal: Patient does not fall  Outcome: Monitoring/Evaluating progress  Goal: Patient takes action to control fall-related risks  Outcome: Monitoring/Evaluating progress     Problem: At Risk for Injury Due to Fall  Goal: Patient does not fall  Outcome: Monitoring/Evaluating progress  Goal: Takes action to control condition specific risks  Outcome: Monitoring/Evaluating progress  Goal: Verbalizes understanding of fall-related injury personal risks  Description: Document education using the patient education activity  Outcome: Monitoring/Evaluating progress     Problem: Pain  Goal: Acceptable pain level achieved/maintained at rest using appropriate pain scale for the patient  Outcome: Monitoring/Evaluating progress  Goal: Acceptable pain level achieved/maintained with activity using appropriate pain scale for the patient  Outcome: Monitoring/Evaluating progress  Goal: Acceptable pain level achieved/maintained without oversedation  Outcome: Monitoring/Evaluating progress     Problem: Impaired Physical Mobility  Goal: Functional status is maintained or returned to baseline during hospitalization  Outcome: Monitoring/Evaluating progress  Goal: Tolerates activity for discharge setting with no abnormal symptoms  Outcome: Monitoring/Evaluating progress     Problem: Breathing Pattern Ineffective  Goal: Air exchange is effective, demonstrated by Sp02 sat of greater then or = 92% (or as ordered)  Outcome: Monitoring/Evaluating progress  Goal: Respiratory pattern is quiet and regular without report of SOB  Outcome: Monitoring/Evaluating progress  Goal: Breathing pattern demonstrates minimal apnea during sleep with appropriate use of airway pressure support devices  Outcome: Monitoring/Evaluating progress  Goal: Verbalizes/demonstrates effective breathing management strategies  Description: Document education using the patient education activity.   Outcome: Monitoring/Evaluating progress  Goal:  Minimize respiratory effort related to dyspnea/shortness of breath (Hospice)  Outcome: Monitoring/Evaluating progress     Problem: Pneumonia  Goal: S/S of acute pneumonia are resolved  Description: If acute pneumonia is present, monitor for resolution of fever, cough, secretions and other test values based on presentation.  Outcome: Monitoring/Evaluating progress  Goal: Verbalizes understanding of pneumonia, treatment, and ongoing prevention  Description: Document on Patient Education Activity  Outcome: Monitoring/Evaluating progress      None